# Patient Record
Sex: MALE | Race: WHITE | Employment: FULL TIME | URBAN - NONMETROPOLITAN AREA
[De-identification: names, ages, dates, MRNs, and addresses within clinical notes are randomized per-mention and may not be internally consistent; named-entity substitution may affect disease eponyms.]

---

## 2022-12-11 NOTE — PROGRESS NOTES
[facility] and [facility] initial consultation note    Mitra Dobbins                                                Chief complaint: Mitra Dobbins is a 40 y. o.oldmale came for further evaluation regarding his sleep apnea  with referral from       Patient underwent sleep study by Tyrus Ganser- telehealth in the past.    [place]:    Sleep/Wake schedule:  Usual time to go to bed during the work/regular day of week:   Usual time to wake up during the work//regular day of week :   Over the weekends his sleep schedule: [] Remain same. []phase delayed. He usually falls a sleep in less than:   He takes naps: {YES/NO:}. Number of naps per week:    During each nap he spends a total of: The naps were reported as refreshing: NO    Sleep Hygiene:    Is the temperature and evironment in his bed room is acceptable to him: Yes. He watches Television in his bed room: {YES/NO:}. He read books, study, pay bills etc in the bed: {YES/NO:}. Frequency He wake up during night/sleep:   Majority of nocturnal awakenings are for urination: {YES/NO:}. Difficulty in falling back to sleep after nocturnal awakenings: No    Do you drink coffee: {YES/NO:}. Do you drink caffeinated beverages i.e sodas: {YES/NO:}. Do you drink tea: {YES/NO:}. Do you drink alcoholic beverages: {[id number]}. History of recreational drug use: {YES/NO:}. Social History     Tobacco Use    Smoking status: Former     Packs/day: 1.00     Years: 8.00     Pack years: 8.00     Types: Cigarettes     Quit date:      Years since quittin.0    Smokeless tobacco: Never   Substance Use Topics    Alcohol use: Yes     Comment: occasional    Drug use: Never       Sleep apnea symptoms:    Hewas diagnosed with sleep apnea in the past.  Please see diagnostic data section for details. History of headaches in the morning:{YES/NO:}.   History of dry mouth in the morning: {YES/NO:}. History of palpitations during night time/nocturnal awakenings: {YES/NO:}. History of sweating during night time/nocturnal awakenings: {YES/NO:}    General:  History of head injury in the past: {YES/NO:}. []  Due to MVA  [] Not due to MVA. History of seizures: NO.  Rest less legs syndrome symptoms:NO  History suggestive of periodic limb movements during sleep: NO  History suggestive of hypnagogic hallucinations: NO  History suggestive of hypnopompic hallucinations: NO  History suggestive of sleep talking: NO  History suggestive of sleep walking:NO  History suggestive of bruxism: NO   [] No mouth guard. [] Uses mouth guard. History suggestive of cataplexy: NO  History suggestive of sleep paralysis:NO    Family history of sleep disorders:  Family history of obstructive sleep apnea: {YES/NO:}. Family history of Narcolepsy: {YES/NO:}. Family history of Rest less legs syndrome : {YES/NO:}. History regarding old sleep studies:  Prior history of sleep study: {YES/NO:}. Please see the diagnostic data section for details. Using CPAP device: {YES/NO:}. Currently using home Oxygen: NO.      Patient considerations:  Is the patient is ambulatory: Yes  Patient is currently using: None of these Wheelchair, [employer] or [employer]. Para/Quadriplegic: NO  Hearing deficit : NO  Claustrophobic: NO  MDD : NO  Blind: NO  Incontinent: NO  Para/Quadraplegi: NO.   Need transportation to and from Sleep Center:NO    Social History:  Social History     Tobacco Use    Smoking status: Former     Packs/day: 1.00     Years: 8.00     Pack years: 8.00     Types: Cigarettes     Quit date:      Years since quittin.0    Smokeless tobacco: Never   Substance Use Topics    Alcohol use: Yes     Comment: occasional    Drug use: Never   . He is currently working: {YES/NO:}. [] Retired.    []Disabled     He usually goes to his work at:     He completes his work at: No past medical history on file. No past surgical history on file. No Known Allergies    Current Outpatient Medications   Medication Sig Dispense Refill    Pantoprazole Sodium (PROTONIX PO) Take by mouth       No current facility-administered medications for this visit. No family history on file. Review of Systems:    General/Constitutional: No recent loss of weight or appetite changes. No fever or chills. HENT: Negative. Eyes: Negative. Upper respiratory tract: No nasal stuffiness or post nasal drip. Lower respiratory tract/ lungs: No cough or sputum production. No hemoptysis. Cardiovascular: No palpitations or chest pain. Gastrointestinal: No nausea or vomiting. Neurological: No focal neurologiacal weakness. Extremities: No edema. Musculoskeletal: No complaints. Genitourinary: No complaints. Hematological: Negative. Psychiatric/Behavioral: Negative. Skin: No itching. /72 (Site: Left Upper Arm, Position: Sitting, Cuff Size: Large Adult)   Pulse 91   Temp 97 °F (36.1 °C)   Ht 6' 1\" (1.854 m)   Wt (!) 371 lb 6.4 oz (168.5 kg)   SpO2 98% Comment: room air at rest  BMI 49.00 kg/m²   BMI:  Body mass index is 49 kg/m². Physical Exam:  Nursing note and vitals reviewed. Constitutional: Patient appears moderately built and moderately nourished. No distress. Patient is oriented to person, place, and time. HENT:   Head: Normocephalic and atraumatic. Right Ear: External ear normal.   Left Ear: External ear normal.   Mouth/Throat: Oropharynx is clear and moist.  No oral thrush. Eyes: Conjunctivae are normal. Pupils are equal, round, and reactive to light. No scleral icterus. Neck: Neck supple. No JVD present. No tracheal deviation present. Cardiovascular: Normal rate, regular rhythm, normal heart sounds. No murmur heard. Pulmonary/Chest: Effort normal and breath sounds normal. No stridor. No respiratory distress. No wheezes. No rales.  Patient exhibits no tenderness. Abdominal: Soft. Patient exhibits no distension. No tenderness. Musculoskeletal: Normal range of motion. Extremities: Patient exhibits no edema and no tenderness. Lymphadenopathy:  No cervical adenopathy. Neurological: Patient is alert and oriented to person, place, and time. Skin: Skin is warm and dry. Patient is not diaphoretic. Psychiatric: Patient  has a normal mood and affect. Patient behavior is normal.     Diagnostic Data:      Sleep test:      CPAP test:      Assessment:  -Mild/Moderate/Severe obstructive sleep apnea on treatment with a CPAP *** cm H20. He is using his PAP device with excellent compliance for >4hours and benefiting from it. He denies any clinical symptoms.  -Inadequate sleep hygiene. No past medical history on file. Recommendations/Plan:  -He was advised to continue current positive airway pressure therapy with above described pressure.  -He advised to keep good compliance with current recommended pressure to get optimal results and clinical improvement.  -Follow with my clinic in 6months for clinical reevaluation with review of download. -He was advised to call [employer] regarding supplies if needed. -He was advised to practice good sleep hygiene techniques. He was provided with a hand out.  -He was advised call my office for earlier appointment if needed for worsening of sleep symptoms.  -Shashank Hensley educated about my impression and plan. Patient verbalizes understanding.      -I personally reviewed updated the Past medical hx, Past surgical hx,Social hx, Family hx, Medications, Allergies in the discrete data section of the patient chart along with labs, Pulmonary medicine,Sleep medicine related, Pathological, Microbiological and Radiological investigations.

## 2023-01-06 ENCOUNTER — INITIAL CONSULT (OUTPATIENT)
Dept: PULMONOLOGY | Age: 38
End: 2023-01-06
Payer: COMMERCIAL

## 2023-01-06 VITALS
DIASTOLIC BLOOD PRESSURE: 72 MMHG | WEIGHT: 315 LBS | BODY MASS INDEX: 41.75 KG/M2 | HEART RATE: 91 BPM | SYSTOLIC BLOOD PRESSURE: 122 MMHG | HEIGHT: 73 IN | TEMPERATURE: 97 F | OXYGEN SATURATION: 98 %

## 2023-01-06 DIAGNOSIS — G47.10 HYPERSOMNIA: ICD-10-CM

## 2023-01-06 DIAGNOSIS — G47.33 OSA ON CPAP: Primary | ICD-10-CM

## 2023-01-06 DIAGNOSIS — Z99.89 OSA ON CPAP: Primary | ICD-10-CM

## 2023-01-06 PROCEDURE — 99204 OFFICE O/P NEW MOD 45 MIN: CPT | Performed by: INTERNAL MEDICINE

## 2023-01-06 NOTE — PROGRESS NOTES
[place] [facility] and [address] initial consultation note    Kecia Lewis                                                Chief complaint: Kecia Lewis is a 40 y. o.oldmale PMHx of GERD came for further evaluation regarding his sleep apnea with referral from self. He saw Dr. Jay Newell in [facility] regularly. Patient went to [place] in November with his friend. While sharing a room his friend recorded him sleeping. His breathing sounded \"like Darth Hettick\". He was concerned about possible sleep apnea. Stated he has woken for the last few years not feeling refreshed. Furthermore his mother stated when he was young after breaking his nose during football he has breathed similarly while sleeping. Wakes in the mornings most of the time with dry mouth and sore throat. Received sleep study about a month ago. Patient underwent sleep study by [facility] in the past by using a Watchpat portable device. [place]:    Sleep/Wake schedule:  Usual time to go to bed during the work/regular day of week: 10:30  Usual time to wake up during the work//regular day of week : 05:30  Over the weekends his sleep schedule:  [x]phase delayed. Sleep schedule radically different. Can fall asleep as late as 4am and wake as late as 9am.   He usually falls a sleep in less than: 45min - 1hour  He takes naps: Yes. Involuntarily. Doze off once or twice per week watching TV. Number of naps per week:  2  During each nap he spends a total of:  30mins-1 hour  The naps were reported as refreshing: Yes    Sleep Hygiene:    Is the temperature and evironment in his bed room is acceptable to him: Yes. He watches Television in his bed room: No.  He read books, study, pay bills etc in the bed: No.  Frequency He wake up during night/sleep: 0 to 1x per night  Majority of nocturnal awakenings are for urination: Yes.     Difficulty in falling back to sleep after nocturnal awakenings: No    Do you drink coffee: No.  - infrequently on the weekends  Do you drink caffeinated beverages i.e sodas: No.    Do you drink tea: No.      Do you drink alcoholic beverages: Yes. 1-2 glasses on weekend  History of recreational drug use: No.     Social History     Tobacco Use    Smoking status: Former     Packs/day: 1.00     Years: 8.00     Pack years: 8.00     Types: Cigarettes     Quit date:      Years since quittin.0    Smokeless tobacco: Never   Substance Use Topics    Alcohol use: Yes     Comment: occasional    Drug use: Never       Sleep apnea symptoms:    Hewas diagnosed with sleep apnea in the past.  Please see diagnostic data section for details. History of headaches in the morning:No.  History of dry mouth in the morning: Yes. History of palpitations during night time/nocturnal awakenings: No.  History of sweating during night time/nocturnal awakenings: No    General:  History of head injury in the past: No.    History of seizures: NO.  Rest less legs syndrome symptoms:NO  History suggestive of periodic limb movements during sleep: NO  History suggestive of hypnagogic hallucinations: NO  History suggestive of hypnopompic hallucinations: NO  History suggestive of sleep talking: NO  History suggestive of sleep walking:NO  History suggestive of bruxism: NO    History suggestive of cataplexy: NO  History suggestive of sleep paralysis:NO    Family history of sleep disorders:  Family history of obstructive sleep apnea: Yes. Mom and uncle  Family history of Narcolepsy: No.   Family history of Rest less legs syndrome : No.       History regarding old sleep studies:  Prior history of sleep study: Yes. Please see the diagnostic data section for details. Using CPAP device: No.  Currently using home Oxygen: NO.      Patient considerations:  Is the patient is ambulatory: Yes  Patient is currently using: None of these Wheelchair, [employer] or [employer].   Para/Quadriplegic: NO  Hearing deficit : NO  Claustrophobic: NO  MDD : NO  Blind: NO  Incontinent: NO  Para/Quadraplegi: NO.   Need transportation to and from Sleep Center:NO    Social History:  Social History     Tobacco Use    Smoking status: Former     Packs/day: 1.00     Years: 8.00     Pack years: 8.00     Types: Cigarettes     Quit date:      Years since quittin.0    Smokeless tobacco: Never   Substance Use Topics    Alcohol use: Yes     Comment: occasional    Drug use: Never   . He is currently working: Yes. He usually goes to his work at:     He completes his work at:                         No past medical history on file. No past surgical history on file. No Known Allergies    Current Outpatient Medications   Medication Sig Dispense Refill    Pantoprazole Sodium (PROTONIX PO) Take by mouth       No current facility-administered medications for this visit. No family history on file. Review of Systems:    General/Constitutional: No 30-40 lbs at beginning of COVID just starting to loose weight maybe a pound or two or appetite changes. No fever or chills. Recent kidney infection 18 Dec 10 days of cipro  HENT: Negative. Eyes: Negative. Upper respiratory tract: No nasal stuffiness or post nasal drip. Lower respiratory tract/ lungs: No cough or sputum production. No hemoptysis. Cardiovascular: No palpitations or chest pain. Gastrointestinal: No nausea or vomiting. Neurological: No focal neurologiacal weakness. Extremities: No edema. Musculoskeletal: No complaints. Genitourinary: No complaints. Recent kidney infection 18 Dec 10 days of cipro  Hematological: Negative. Psychiatric/Behavioral: Negative. Skin: No itching. /72 (Site: Left Upper Arm, Position: Sitting, Cuff Size: Large Adult)   Pulse 91   Temp 97 °F (36.1 °C)   Ht 6' 1\" (1.854 m)   Wt (!) 371 lb 6.4 oz (168.5 kg)   SpO2 98% Comment: room air at rest  BMI 49.00 kg/m²   BMI:  Body mass index is 49 kg/m². Mallampati airway Class:4  Neck Circumference:. 19Inches  [place] sleepiness score 1/6/23: 11.  Sleep apnea quality of life questionnaire: 64    Physical Exam:  Nursing note and vitals reviewed. Constitutional: Obese male sitting comfortably  No distress. Patient is oriented to person, place, and time. HENT:   Head: Normocephalic and atraumatic. Right Ear: External ear normal.  Hearing grossly intact  Left Ear: External ear normal. Hearing grossly intact  Mouth/Throat: Oropharynx is clear and moist.  No oral thrush. Eyes: Conjunctivae are normal.  EOMI, pupils are equal, round, and reactive to light. No scleral icterus. Neck: Neck supple. No JVD present. No tracheal deviation present. Cardiovascular: Normal rate, regular rhythm, normal heart sounds. No murmur heard. Pulmonary/Chest: Effort normal and breath sounds normal. No stridor. No respiratory distress. No wheezes. No rales. Patient exhibits no tenderness. Abdominal: Soft. Patient exhibits no distension. No tenderness. Musculoskeletal: Normal range of motion. Extremities: Patient exhibits no edema and no tenderness. Lymphadenopathy:  No cervical adenopathy. Neurological: Patient is alert and oriented to person, place, and time. Skin: Skin is warm and dry. Patient is not diaphoretic. Psychiatric: Patient  has a normal mood and affect. Patient behavior is normal.     Diagnostic Data:      Sleep test:  Patient underwent portable sleep study by using watch pat device on 9 November 2022 by using a telehealth physician services. However, the details of the methods were not mentioned in the dictation by the interpreting physician to confirm the device used for sleep apnea testing. CPAP test:  None in epic      Assessment:  -Moderately severe obstructive sleep apnea diagnosed by a portable/home sleep study performed on 9 November 2022 via [employer] [facility]. The study was performed at using the watch pat device (this need to be confirmed).   The details of the methods used for testing were not available to confirm in the dictation.  -Inadequate sleep hygiene.  -Hypersomnia ( Excessive daytime sleepiness) may be due to obstructive sleep apnea Vs Inadequate sleep hygiene.  -He denied any prior history of COPD, CHF, Obesity hypoventilation, Prior palate surgery and Central sleep apnea. Recommendations/Plan:  -He was advised to contact his telehealth provider to obtain detailed methods report used to perform his sleep apnea testing/home sleep study on 9 November 2022 by using watch pat device. He was also informed about the possible need of repeat sleep study if the details of methods did not meet the AASM criteria for portable sleep apnea testing.    -I had a discussion with patient regarding avialable treatment options for his sleep disorder breathing including but not limited to CPAP titration in the sleep lab Vs.Dental appliance placement with referral to a local dentist Vs other available surgical options including Uvulopalatopharyngoplasty, maxillomandibular ostomy and tracheostomy as last option. At the end of discussion, he decided to go for treatment with a CPAP device therapy.  -Will start patient on Auto CPAP machine therapy with a Minimum CPAP of 6cm H20 and Maximum CPAP of 20cm H20. He need to be followed closely with down load by DME company in 4 weeks. He was instructed to make earlier appointment with my sleep clinic if he had any difficulty in using his auto CPAP machine therapy to consider for in lab CPAP titration. He verbalizes understanding.  -He was advised to make early appointment with my clinic if he develops any of the following conditions including -> COPD, CHF,Obesity hypoventilation syndrome, undergo palate surgery and Central sleep apnea his Auto CPAP/BiPAP settings to a fixed CPAP/BiPAP pressure settings.  He verbalizes understanding.  -He need to come for follow up with my clinic in 2months to 10 weeks with a CPAP down load for clinical reevaluation and management.  -he advised to keep good compliance with recommended positive airway pressure therapy to get optimal results and clinical improvement.  -He was advised to call DME company regarding supplies if needed. -He was advised to call my office for earlier appointment if needed for worsening of sleep symptoms.  -He was advised to continue to practice good sleep hygiene practices.  -He was instructed to not to drive any motor vehicles or operate heavy equipment if he feels sleepy. -He was advised to loose weight by controlling diet and doing exercise once cleared by his family physician. -Brionna Castro was educated about my impression and plan. He verbalizes understanding.      -I personally reviewed updated the Past medical hx, Past surgical hx,Social hx, Family hx, Medications, Allergies in the discrete data section of the patient chart along with labs, Pulmonary medicine,Sleep medicine related, Pathological, Microbiological and Radiological investigations. Addendum by Dr. Alondra Cm MD:  Patient seen by me independently including key components of medical care. Face to face evaluation and examination was performed. Case discussed with Jasmina Smyth DO -resident physician. Italicized font, if present,  represents changes to the note made by me. More than 50% of the encounter time involved with patient care by the Pulmonary & Critical care service team spent by me. Please see my modifications mentioned below:  See detailed plan as above  Brionna Castro educated about my impression and plan. He verbalizes understanding.     Electronically signed by   Emili Gregg MD on 1/6/2023 at 10:52 AM

## 2023-01-06 NOTE — PROGRESS NOTES
Chief Complaint: New sleep consult    Mallampati airway Class:4  Neck Circumference:. 19Inches    [place] sleepiness score 1/6/23: .   Sleep apnea quality of life questionnaire:

## 2024-01-07 ENCOUNTER — HOSPITAL ENCOUNTER (EMERGENCY)
Age: 39
Discharge: HOME | End: 2024-01-07
Attending: EMERGENCY MEDICINE
Payer: COMMERCIAL

## 2024-01-07 ENCOUNTER — APPOINTMENT (OUTPATIENT)
Dept: GENERAL RADIOLOGY | Age: 39
End: 2024-01-07
Payer: COMMERCIAL

## 2024-01-07 VITALS
DIASTOLIC BLOOD PRESSURE: 94 MMHG | OXYGEN SATURATION: 98 % | RESPIRATION RATE: 13 BRPM | TEMPERATURE: 97.6 F | WEIGHT: 310 LBS | HEART RATE: 72 BPM | SYSTOLIC BLOOD PRESSURE: 148 MMHG | HEIGHT: 73 IN | BODY MASS INDEX: 41.08 KG/M2

## 2024-01-07 DIAGNOSIS — G56.22 ULNAR NERVE COMPRESSION, LEFT: ICD-10-CM

## 2024-01-07 DIAGNOSIS — R07.9 CHEST PAIN, UNSPECIFIED TYPE: Primary | ICD-10-CM

## 2024-01-07 LAB
ANION GAP SERPL CALC-SCNC: 11 MEQ/L (ref 8–16)
BASOPHILS ABSOLUTE: 0 THOU/MM3 (ref 0–0.1)
BASOPHILS NFR BLD AUTO: 0.4 %
BUN SERPL-MCNC: 9 MG/DL (ref 7–22)
CALCIUM SERPL-MCNC: 9.7 MG/DL (ref 8.5–10.5)
CHLORIDE SERPL-SCNC: 103 MEQ/L (ref 98–111)
CO2 SERPL-SCNC: 26 MEQ/L (ref 23–33)
CREAT SERPL-MCNC: 0.7 MG/DL (ref 0.4–1.2)
DEPRECATED RDW RBC AUTO: 41.8 FL (ref 35–45)
EKG ATRIAL RATE: 81 BPM
EKG P AXIS: 49 DEGREES
EKG P-R INTERVAL: 184 MS
EKG Q-T INTERVAL: 378 MS
EKG QRS DURATION: 90 MS
EKG QTC CALCULATION (BAZETT): 439 MS
EKG R AXIS: 25 DEGREES
EKG T AXIS: 57 DEGREES
EKG VENTRICULAR RATE: 81 BPM
EOSINOPHIL NFR BLD AUTO: 2.5 %
EOSINOPHILS ABSOLUTE: 0.3 THOU/MM3 (ref 0–0.4)
ERYTHROCYTE [DISTWIDTH] IN BLOOD BY AUTOMATED COUNT: 13 % (ref 11.5–14.5)
GFR SERPL CREATININE-BSD FRML MDRD: > 60 ML/MIN/1.73M2
GLUCOSE SERPL-MCNC: 100 MG/DL (ref 70–108)
HCT VFR BLD AUTO: 44.6 % (ref 42–52)
HGB BLD-MCNC: 14.9 GM/DL (ref 14–18)
IMM GRANULOCYTES # BLD AUTO: 0.03 THOU/MM3 (ref 0–0.07)
IMM GRANULOCYTES NFR BLD AUTO: 0.3 %
LYMPHOCYTES ABSOLUTE: 2.9 THOU/MM3 (ref 1–4.8)
LYMPHOCYTES NFR BLD AUTO: 24.8 %
MCH RBC QN AUTO: 29.3 PG (ref 26–33)
MCHC RBC AUTO-ENTMCNC: 33.4 GM/DL (ref 32.2–35.5)
MCV RBC AUTO: 87.8 FL (ref 80–94)
MONOCYTES ABSOLUTE: 0.8 THOU/MM3 (ref 0.4–1.3)
MONOCYTES NFR BLD AUTO: 6.7 %
NEUTROPHILS NFR BLD AUTO: 65.3 %
NRBC BLD AUTO-RTO: 0 /100 WBC
OSMOLALITY SERPL CALC.SUM OF ELEC: 278.2 MOSMOL/KG (ref 275–300)
PLATELET # BLD AUTO: 310 THOU/MM3 (ref 130–400)
PMV BLD AUTO: 9.6 FL (ref 9.4–12.4)
POTASSIUM SERPL-SCNC: 4 MEQ/L (ref 3.5–5.2)
RBC # BLD AUTO: 5.08 MILL/MM3 (ref 4.7–6.1)
SEGMENTED NEUTROPHILS ABSOLUTE COUNT: 7.6 THOU/MM3 (ref 1.8–7.7)
SODIUM SERPL-SCNC: 140 MEQ/L (ref 135–145)
TROPONIN, HIGH SENSITIVITY: < 6 NG/L (ref 0–12)
WBC # BLD AUTO: 11.7 THOU/MM3 (ref 4.8–10.8)

## 2024-01-07 PROCEDURE — 80048 BASIC METABOLIC PNL TOTAL CA: CPT

## 2024-01-07 PROCEDURE — 93010 ELECTROCARDIOGRAM REPORT: CPT | Performed by: INTERNAL MEDICINE

## 2024-01-07 PROCEDURE — 84484 ASSAY OF TROPONIN QUANT: CPT

## 2024-01-07 PROCEDURE — 85025 COMPLETE CBC W/AUTO DIFF WBC: CPT

## 2024-01-07 PROCEDURE — 93005 ELECTROCARDIOGRAM TRACING: CPT | Performed by: EMERGENCY MEDICINE

## 2024-01-07 PROCEDURE — 71045 X-RAY EXAM CHEST 1 VIEW: CPT

## 2024-01-07 PROCEDURE — 36415 COLL VENOUS BLD VENIPUNCTURE: CPT

## 2024-01-07 PROCEDURE — 99285 EMERGENCY DEPT VISIT HI MDM: CPT

## 2024-01-07 NOTE — ED PROVIDER NOTES
Notable for the following components:       Result Value    WBC 11.7 (*)     All other components within normal limits   BASIC METABOLIC PANEL   TROPONIN   ANION GAP   GLOMERULAR FILTRATION RATE, ESTIMATED   OSMOLALITY       EMERGENCY DEPARTMENT COURSE:   Vitals:    Vitals:    01/07/24 1559 01/07/24 1639 01/07/24 1705 01/07/24 1739   BP: (!) 164/104 (!) 148/94     Pulse: 77 73 72 72   Resp: 21 13 18 13   Temp:       TempSrc:       SpO2:  97% 97% 98%   Weight:       Height:             CRITICAL CARE:       CONSULTS:  None    PROCEDURES:  none    FINAL IMPRESSION      1. Chest pain, unspecified type    2. Ulnar nerve compression, left          DISPOSITION/PLAN   Decision To Discharge    PATIENT REFERRED TO:  [facility] PHYSICIAN [facility]  [address]  [address]  [facility] [address]  Go in 1 day  RE-CHECK AND FURTHER TESTING AS NEEDED FOR tingling in your fingers    [facility]  [address] [place], [place] [id number] [phone]  Schedule an appointment as soon as possible for a visit   Follow-up for chest pain      DISCHARGE MEDICATIONS:  Discharge Medication List as of 1/7/2024  5:42 PM          (Please note that portions of this note were completed with a voice recognition program.  Efforts were made to edit the dictations but occasionally words are mis-transcribed.)    DO Shamir Roach Seth, DO  01/09/24 0729

## 2024-06-03 ENCOUNTER — TELEPHONE (OUTPATIENT)
Dept: FAMILY MEDICINE CLINIC | Age: 39
End: 2024-06-03

## 2024-06-03 NOTE — TELEPHONE ENCOUNTER
----- Message from Ladarius Aponte sent at 6/3/2024 10:02 AM EDT -----  Regarding: [facility] Appointment Request  [facility] Appointment Request    Patient needs appointment for [facility] Appointment Type: New Patient.    Reason for Appointment Request: No appointments available during search/ Pt wanted to establish care with Halima Tsang DO. Pt preferred date and time whenever is fine for him.  --------------------------------------------------------------------------------------------------------------------------    Relationship to Patient: Self     Call Back Information: OK to leave message on voicemail  Preferred Call Back Number: Phone [phone]

## 2024-06-06 ENCOUNTER — PATIENT MESSAGE (OUTPATIENT)
Dept: FAMILY MEDICINE CLINIC | Age: 39
End: 2024-06-06

## 2024-06-06 DIAGNOSIS — R19.7 DIARRHEA OF PRESUMED INFECTIOUS ORIGIN: Primary | ICD-10-CM

## 2024-06-06 NOTE — TELEPHONE ENCOUNTER
From: Heriberto Woods  To: Dr. Halima Tsang  Sent: 6/6/2024 2:07 PM EDT  Subject: New patient question     Halima,   Apologies on pestering you before our first appointment. But I’m unable to reach my gi the last two days. I had cdiff last November and have been having rough symptoms the past week. Is there anyway you can have a toxins test setup for me [facility] [place] ? So sorry to bother I’m just running out of options currently

## 2024-06-07 ENCOUNTER — NURSE ONLY (OUTPATIENT)
Dept: LAB | Age: 39
End: 2024-06-07

## 2024-06-07 DIAGNOSIS — R19.7 DIARRHEA OF PRESUMED INFECTIOUS ORIGIN: ICD-10-CM

## 2024-06-07 LAB
REASON FOR REJECTION: NORMAL
REJECTED TEST: NORMAL

## 2024-06-09 LAB — BACTERIA STL CULT: NORMAL

## 2024-06-10 ENCOUNTER — TELEPHONE (OUTPATIENT)
Dept: FAMILY MEDICINE CLINIC | Age: 39
End: 2024-06-10

## 2024-06-10 NOTE — TELEPHONE ENCOUNTER
I copy and pasted JESUS Luna - CNP response regarding the patient's lab results and sent them to the patient via My Chart.

## 2024-07-08 SDOH — ECONOMIC STABILITY: HOUSING INSECURITY
IN THE LAST 12 MONTHS, WAS THERE A TIME WHEN YOU DID NOT HAVE A STEADY PLACE TO SLEEP OR SLEPT IN A SHELTER (INCLUDING NOW)?: NO

## 2024-07-08 SDOH — ECONOMIC STABILITY: FOOD INSECURITY: WITHIN THE PAST 12 MONTHS, YOU WORRIED THAT YOUR FOOD WOULD RUN OUT BEFORE YOU GOT MONEY TO BUY MORE.: NEVER TRUE

## 2024-07-08 SDOH — ECONOMIC STABILITY: FOOD INSECURITY: WITHIN THE PAST 12 MONTHS, THE FOOD YOU BOUGHT JUST DIDN'T LAST AND YOU DIDN'T HAVE MONEY TO GET MORE.: NEVER TRUE

## 2024-07-08 SDOH — ECONOMIC STABILITY: INCOME INSECURITY: HOW HARD IS IT FOR YOU TO PAY FOR THE VERY BASICS LIKE FOOD, HOUSING, MEDICAL CARE, AND HEATING?: NOT HARD AT ALL

## 2024-07-08 SDOH — ECONOMIC STABILITY: TRANSPORTATION INSECURITY
IN THE PAST 12 MONTHS, HAS LACK OF TRANSPORTATION KEPT YOU FROM MEETINGS, WORK, OR FROM GETTING THINGS NEEDED FOR DAILY LIVING?: NO

## 2024-07-08 ASSESSMENT — PATIENT HEALTH QUESTIONNAIRE - PHQ9
1. LITTLE INTEREST OR PLEASURE IN DOING THINGS: NOT AT ALL
SUM OF ALL RESPONSES TO PHQ QUESTIONS 1-9: 0
SUM OF ALL RESPONSES TO PHQ9 QUESTIONS 1 & 2: 0
1. LITTLE INTEREST OR PLEASURE IN DOING THINGS: NOT AT ALL
SUM OF ALL RESPONSES TO PHQ9 QUESTIONS 1 & 2: 0
2. FEELING DOWN, DEPRESSED OR HOPELESS: NOT AT ALL
SUM OF ALL RESPONSES TO PHQ QUESTIONS 1-9: 0
2. FEELING DOWN, DEPRESSED OR HOPELESS: NOT AT ALL

## 2024-07-11 ENCOUNTER — OFFICE VISIT (OUTPATIENT)
Dept: FAMILY MEDICINE CLINIC | Age: 39
End: 2024-07-11
Payer: COMMERCIAL

## 2024-07-11 ENCOUNTER — PATIENT MESSAGE (OUTPATIENT)
Dept: FAMILY MEDICINE CLINIC | Age: 39
End: 2024-07-11

## 2024-07-11 VITALS
SYSTOLIC BLOOD PRESSURE: 126 MMHG | WEIGHT: 305.2 LBS | TEMPERATURE: 96.9 F | OXYGEN SATURATION: 97 % | HEART RATE: 81 BPM | HEIGHT: 73 IN | DIASTOLIC BLOOD PRESSURE: 86 MMHG | BODY MASS INDEX: 40.45 KG/M2 | RESPIRATION RATE: 16 BRPM

## 2024-07-11 DIAGNOSIS — R19.4 BOWEL HABIT CHANGES: Primary | ICD-10-CM

## 2024-07-11 DIAGNOSIS — K21.9 GASTROESOPHAGEAL REFLUX DISEASE WITHOUT ESOPHAGITIS: ICD-10-CM

## 2024-07-11 DIAGNOSIS — Z13.9 SCREENING DUE: ICD-10-CM

## 2024-07-11 PROCEDURE — 99203 OFFICE O/P NEW LOW 30 MIN: CPT | Performed by: FAMILY MEDICINE

## 2024-07-11 RX ORDER — ESOMEPRAZOLE MAGNESIUM 40 MG/1
40 GRANULE, DELAYED RELEASE ORAL DAILY
COMMUNITY
Start: 2024-06-25 | End: 2024-07-11

## 2024-07-11 RX ORDER — ESOMEPRAZOLE MAGNESIUM 40 MG/1
40 CAPSULE, DELAYED RELEASE ORAL
COMMUNITY

## 2024-07-11 RX ORDER — CHLORHEXIDINE GLUCONATE 4 %
LIQUID (ML) TOPICAL
COMMUNITY
Start: 2024-05-09 | End: 2024-07-11

## 2024-07-11 RX ORDER — METRONIDAZOLE 500 MG/1
500 TABLET ORAL 3 TIMES DAILY
Qty: 21 TABLET | Refills: 0 | Status: SHIPPED | OUTPATIENT
Start: 2024-07-11 | End: 2024-07-18

## 2024-07-11 RX ORDER — FAMOTIDINE 40 MG/1
40 TABLET, FILM COATED ORAL
COMMUNITY
Start: 2024-06-25

## 2024-07-11 ASSESSMENT — ENCOUNTER SYMPTOMS
WHEEZING: 0
ABDOMINAL PAIN: 1
DIARRHEA: 1
CONSTIPATION: 1
VOMITING: 0
SHORTNESS OF BREATH: 0
COUGH: 0
NAUSEA: 0

## 2024-07-11 NOTE — PROGRESS NOTES
Heriberto Woods (:  1985) is a 38 y.o. male,New patient, here for evaluation of the following chief complaint(s):  New Patient and GI Problem      Subjective   SUBJECTIVE/OBJECTIVE:  HPI  Tolerating medications well now that they switched to pill form of Nexium  Hasn't had problems with GERD since losing weight  Taking medications every day yes, compliant  Had labs done last , believes they were all normal  Additional concerns misdiagnosed C diff for 18 months, was given Cipro for 14 days for kidney infection, [facility] for treatment, retested in 2024 that was negative   2022 with initial symptoms then diagnosed 2023 with C diff, continued issues with bowels  Just did EGD and colonoscopy---unremarkable  Has follow up with GI on the   Weight loss over 100 lbs attempted, started prior to C diff    Review of Systems   Constitutional:  Positive for fatigue and unexpected weight change (attempted). Negative for activity change, chills and fever.   Respiratory:  Negative for cough, shortness of breath and wheezing.    Cardiovascular:  Negative for chest pain, palpitations and leg swelling.   Gastrointestinal:  Positive for abdominal pain (left lower quadrant, feels like when he had C diff), constipation (with fiber use, hard small stools) and diarrhea (porridge type consistency, 1-2 times per day normally). Negative for nausea and vomiting.          Objective   Physical Exam  Vitals reviewed.   Constitutional:       General: He is not in acute distress.     Appearance: Normal appearance. He is obese. He is not ill-appearing.   Cardiovascular:      Rate and Rhythm: Normal rate and regular rhythm.      Heart sounds: No murmur heard.     No gallop.   Pulmonary:      Effort: Pulmonary effort is normal.      Breath sounds: Normal breath sounds. No wheezing, rhonchi or rales.   Abdominal:      General: Abdomen is flat. Bowel sounds are increased. There is no distension.      Palpations: Abdomen is soft.

## 2024-07-11 NOTE — TELEPHONE ENCOUNTER
From: Dr. Halima Tsang  To: Heriberto Woods  Sent: 7/11/2024 8:40 AM EDT  Subject: labs    Heriberto-  Neither [facility], [facility], [facility] on [address] had any records of screening, HbA1C labs from last fall.

## 2024-07-11 NOTE — TELEPHONE ENCOUNTER
It doesn't cause relapse, it is just sometimes too weak to treat a severe infection.  I wanted to try something weaker to begin with...  I'll send the lab order to [facility]

## 2024-07-12 ENCOUNTER — LAB (OUTPATIENT)
Dept: LAB | Age: 39
End: 2024-07-12

## 2024-07-12 DIAGNOSIS — K21.9 GASTROESOPHAGEAL REFLUX DISEASE WITHOUT ESOPHAGITIS: ICD-10-CM

## 2024-07-12 DIAGNOSIS — Z13.9 SCREENING DUE: ICD-10-CM

## 2024-07-12 LAB
ALBUMIN SERPL BCG-MCNC: 4.6 G/DL (ref 3.5–5.1)
ALP SERPL-CCNC: 96 U/L (ref 38–126)
ALT SERPL W/O P-5'-P-CCNC: 13 U/L (ref 11–66)
ANION GAP SERPL CALC-SCNC: 12 MEQ/L (ref 8–16)
AST SERPL-CCNC: 14 U/L (ref 5–40)
BASOPHILS ABSOLUTE: 0.1 THOU/MM3 (ref 0–0.1)
BASOPHILS NFR BLD AUTO: 0.5 %
BILIRUB SERPL-MCNC: 1.4 MG/DL (ref 0.3–1.2)
BUN SERPL-MCNC: 9 MG/DL (ref 7–22)
CALCIUM SERPL-MCNC: 9.8 MG/DL (ref 8.5–10.5)
CHLORIDE SERPL-SCNC: 98 MEQ/L (ref 98–111)
CHOLEST SERPL-MCNC: 180 MG/DL (ref 100–199)
CO2 SERPL-SCNC: 27 MEQ/L (ref 23–33)
CREAT SERPL-MCNC: 0.8 MG/DL (ref 0.4–1.2)
DEPRECATED MEAN GLUCOSE BLD GHB EST-ACNC: 93 MG/DL (ref 70–126)
DEPRECATED RDW RBC AUTO: 38.9 FL (ref 35–45)
EOSINOPHIL NFR BLD AUTO: 3.1 %
EOSINOPHILS ABSOLUTE: 0.3 THOU/MM3 (ref 0–0.4)
ERYTHROCYTE [DISTWIDTH] IN BLOOD BY AUTOMATED COUNT: 12.4 % (ref 11.5–14.5)
GFR SERPL CREATININE-BSD FRML MDRD: > 90 ML/MIN/1.73M2
GLUCOSE SERPL-MCNC: 89 MG/DL (ref 70–108)
HBA1C MFR BLD HPLC: 5.1 % (ref 4.4–6.4)
HCT VFR BLD AUTO: 43.3 % (ref 42–52)
HDLC SERPL-MCNC: 45 MG/DL
HGB BLD-MCNC: 14.8 GM/DL (ref 14–18)
IMM GRANULOCYTES # BLD AUTO: 0.04 THOU/MM3 (ref 0–0.07)
IMM GRANULOCYTES NFR BLD AUTO: 0.4 %
LDLC SERPL CALC-MCNC: 110 MG/DL
LYMPHOCYTES ABSOLUTE: 2.9 THOU/MM3 (ref 1–4.8)
LYMPHOCYTES NFR BLD AUTO: 28.9 %
MCH RBC QN AUTO: 29.8 PG (ref 26–33)
MCHC RBC AUTO-ENTMCNC: 34.2 GM/DL (ref 32.2–35.5)
MCV RBC AUTO: 87.1 FL (ref 80–94)
MONOCYTES ABSOLUTE: 0.6 THOU/MM3 (ref 0.4–1.3)
MONOCYTES NFR BLD AUTO: 6.1 %
NEUTROPHILS ABSOLUTE: 6.2 THOU/MM3 (ref 1.8–7.7)
NEUTROPHILS NFR BLD AUTO: 61 %
NRBC BLD AUTO-RTO: 0 /100 WBC
PLATELET # BLD AUTO: 325 THOU/MM3 (ref 130–400)
PMV BLD AUTO: 10.9 FL (ref 9.4–12.4)
POTASSIUM SERPL-SCNC: 4.1 MEQ/L (ref 3.5–5.2)
PROT SERPL-MCNC: 7.7 G/DL (ref 6.1–8)
RBC # BLD AUTO: 4.97 MILL/MM3 (ref 4.7–6.1)
SODIUM SERPL-SCNC: 137 MEQ/L (ref 135–145)
TRIGL SERPL-MCNC: 127 MG/DL (ref 0–199)
WBC # BLD AUTO: 10.1 THOU/MM3 (ref 4.8–10.8)

## 2024-07-31 ENCOUNTER — TELEMEDICINE (OUTPATIENT)
Dept: FAMILY MEDICINE CLINIC | Age: 39
End: 2024-07-31
Payer: COMMERCIAL

## 2024-07-31 DIAGNOSIS — F41.9 ANXIETY: ICD-10-CM

## 2024-07-31 DIAGNOSIS — K21.9 GASTROESOPHAGEAL REFLUX DISEASE WITHOUT ESOPHAGITIS: Primary | ICD-10-CM

## 2024-07-31 DIAGNOSIS — K58.9 IRRITABLE BOWEL SYNDROME, UNSPECIFIED TYPE: ICD-10-CM

## 2024-07-31 PROCEDURE — 99422 OL DIG E/M SVC 11-20 MIN: CPT | Performed by: FAMILY MEDICINE

## 2024-07-31 RX ORDER — SERTRALINE HYDROCHLORIDE 25 MG/1
25 TABLET, FILM COATED ORAL DAILY
Qty: 30 TABLET | Refills: 0 | Status: SHIPPED | OUTPATIENT
Start: 2024-07-31

## 2024-07-31 ASSESSMENT — ENCOUNTER SYMPTOMS
DIARRHEA: 1
NAUSEA: 1
ABDOMINAL PAIN: 1
WHEEZING: 0
VOMITING: 0
COUGH: 0
SHORTNESS OF BREATH: 0
CONSTIPATION: 1

## 2024-07-31 NOTE — PROGRESS NOTES
Heriberto Woods, was evaluated through a synchronous (real-time) audio-video encounter. The patient (or guardian if applicable) is aware that this is a billable service, which includes applicable co-pays. This Virtual Visit was conducted with patient's (and/or legal guardian's) consent. Patient identification was verified, and a caregiver was present when appropriate.   The patient was located at Home:  [address] [place]  [facility] [address]  Provider was located at Facility (Appt Dept): [address] [place] [phone]  Confirm you are appropriately licensed, registered, or certified to deliver care in the state where the patient is located as indicated above. If you are not or unsure, please re-schedule the visit: Yes, I confirm.     Heriberto Woods (:  1985) is a Established patient, presenting virtually for evaluation of the following:    Assessment & Plan   Below is the assessment and plan developed based on review of pertinent history, physical exam, labs, studies, and medications.  1. Gastroesophageal reflux disease without esophagitis  2. Irritable bowel syndrome, unspecified type  3. Anxiety  -     sertraline (ZOLOFT) 25 MG tablet; Take 1 tablet by mouth daily, Disp-30 tablet, R-0Normal  Also discussed taking some time off work to help with the anxiety and allow for the medication to work    Return if symptoms worsen or fail to improve.       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

## 2024-08-02 ENCOUNTER — PATIENT MESSAGE (OUTPATIENT)
Dept: FAMILY MEDICINE CLINIC | Age: 39
End: 2024-08-02

## 2024-08-02 DIAGNOSIS — R19.7 DIARRHEA, UNSPECIFIED TYPE: Primary | ICD-10-CM

## 2024-08-05 NOTE — TELEPHONE ENCOUNTER
From: Heriberto Woods  To: Dr. Halima Tsang  Sent: 8/2/2024 3:19 PM EDT  Subject: Stomach issues    Dr. Varghese,     When w least spoke I mentioned having loose stools from Chinese food. It is continuing today - day 5- and it makes me sprint to the bathroom 30 minutes after eating anything. Should I retest for cdiff? I havnt felt this urgency in a long time for this duration. Sorry to pester just worrying it has continued for 5 days

## 2024-08-06 NOTE — TELEPHONE ENCOUNTER
I put in the order for [facility] with the following comment:  Please test if possible regardless of consistency of stool, as patient had positive C diff in the past with slightly formed stools

## 2024-08-07 ENCOUNTER — LAB (OUTPATIENT)
Dept: LAB | Age: 39
End: 2024-08-07

## 2024-08-07 DIAGNOSIS — R19.7 DIARRHEA, UNSPECIFIED TYPE: ICD-10-CM

## 2024-08-07 LAB — C DIFF GDH STL QL: NEGATIVE

## 2024-08-09 LAB — BACTERIA STL CULT: NORMAL

## 2024-08-12 ENCOUNTER — PATIENT MESSAGE (OUTPATIENT)
Dept: FAMILY MEDICINE CLINIC | Age: 39
End: 2024-08-12

## 2024-08-13 NOTE — TELEPHONE ENCOUNTER
The patient called and said that he is going to have his mom, Annika Overs  this form, he will sign it and turn it in.     I gave the forms to Stefany upfront and let her know.

## 2024-08-20 ENCOUNTER — PATIENT MESSAGE (OUTPATIENT)
Dept: FAMILY MEDICINE CLINIC | Age: 39
End: 2024-08-20

## 2024-08-20 DIAGNOSIS — F41.9 ANXIETY: Primary | ICD-10-CM

## 2024-11-20 ENCOUNTER — PATIENT MESSAGE (OUTPATIENT)
Dept: FAMILY MEDICINE CLINIC | Age: 39
End: 2024-11-20

## 2024-11-20 DIAGNOSIS — F41.9 ANXIETY: Primary | ICD-10-CM

## 2024-11-20 NOTE — TELEPHONE ENCOUNTER
Heriberto Woods needs refill of   Requested Prescriptions      No prescriptions requested or ordered in this encounter       Last Filled on:  8/26/2024 #30 R-0 sent to [facility] in [place] by Halima Tsang DO.    Last Visit Date:  7/31/2024    Next Visit Date:  Visit date not found

## 2025-01-13 SDOH — ECONOMIC STABILITY: FOOD INSECURITY: WITHIN THE PAST 12 MONTHS, THE FOOD YOU BOUGHT JUST DIDN'T LAST AND YOU DIDN'T HAVE MONEY TO GET MORE.: NEVER TRUE

## 2025-01-13 SDOH — ECONOMIC STABILITY: INCOME INSECURITY: IN THE LAST 12 MONTHS, WAS THERE A TIME WHEN YOU WERE NOT ABLE TO PAY THE MORTGAGE OR RENT ON TIME?: NO

## 2025-01-13 SDOH — ECONOMIC STABILITY: TRANSPORTATION INSECURITY
IN THE PAST 12 MONTHS, HAS THE LACK OF TRANSPORTATION KEPT YOU FROM MEDICAL APPOINTMENTS OR FROM GETTING MEDICATIONS?: NO

## 2025-01-13 SDOH — ECONOMIC STABILITY: FOOD INSECURITY: WITHIN THE PAST 12 MONTHS, YOU WORRIED THAT YOUR FOOD WOULD RUN OUT BEFORE YOU GOT MONEY TO BUY MORE.: NEVER TRUE

## 2025-01-14 ENCOUNTER — TELEMEDICINE (OUTPATIENT)
Dept: FAMILY MEDICINE CLINIC | Age: 40
End: 2025-01-14
Payer: COMMERCIAL

## 2025-01-14 DIAGNOSIS — K21.9 GASTROESOPHAGEAL REFLUX DISEASE WITHOUT ESOPHAGITIS: Primary | ICD-10-CM

## 2025-01-14 DIAGNOSIS — K58.9 IRRITABLE BOWEL SYNDROME, UNSPECIFIED TYPE: ICD-10-CM

## 2025-01-14 PROCEDURE — 99213 OFFICE O/P EST LOW 20 MIN: CPT | Performed by: FAMILY MEDICINE

## 2025-01-14 ASSESSMENT — ENCOUNTER SYMPTOMS
DIARRHEA: 1
WHEEZING: 0
VOMITING: 0
SHORTNESS OF BREATH: 0
CONSTIPATION: 0
COUGH: 0
NAUSEA: 1
ABDOMINAL PAIN: 0

## 2025-01-14 NOTE — PROGRESS NOTES
Heriberto Woods, was evaluated through a synchronous (real-time) audio-video encounter. The patient (or guardian if applicable) is aware that this is a billable service, which includes applicable co-pays. This Virtual Visit was conducted with patient's (and/or legal guardian's) consent. Patient identification was verified, and a caregiver was present when appropriate.   The patient was located at Home:  [facility] [id number]  Provider was located at Facility (Appt Dept): [address] [place] [phone]  Confirm you are appropriately licensed, registered, or certified to deliver care in the state where the patient is located as indicated above. If you are not or unsure, please re-schedule the visit: Yes, I confirm.     Heriberto Woods (:  1985) is a Established patient, presenting virtually for evaluation of the following:      Below is the assessment and plan developed based on review of pertinent history, physical exam, labs, studies, and medications.     Assessment & Plan  Gastroesophageal reflux disease without esophagitis    Discussed with patient that his symptoms seem to be flare up of her GERD, likely due to infection, recommend increasing Nexium to twice a day for the next week or two         Irritable bowel syndrome, unspecified type    Discussed with patient that his symptoms are likely flare up of his IBS, often due to viral infection, recommend avoiding high inflammation foods like tomato based, dairy based foods           Return if symptoms worsen or fail to improve.       Subjective   HPI  Patient presents with stool changes for last 2-3 weeks  Known exposures had a lot of diarrhea, fevers for 2-3 days, fatigue at [employer], gradually reintroduced foods into diet  Treatment normal Nexium  Better much better today  Worse when he has sense of urgency then he has to get to the bathroom immediately, happens about once a day (goes normally in the morning), has noticed

## 2025-01-14 NOTE — ASSESSMENT & PLAN NOTE
Discussed with patient that his symptoms seem to be flare up of her GERD, likely due to infection, recommend increasing Nexium to twice a day for the next week or two

## 2025-05-11 DIAGNOSIS — F41.9 ANXIETY: ICD-10-CM

## 2025-05-12 NOTE — TELEPHONE ENCOUNTER
Heriberto Woods needs refill of   Requested Prescriptions     Pending Prescriptions Disp Refills    sertraline (ZOLOFT) 50 MG tablet [Pharmacy Med Name: Sertraline HCl Oral Tablet 50 MG] 90 tablet 0     Sig: Take 1 tablet by mouth daily       Last Filled on:  11- #90 R-1 and sent to [facility] in [place], [place] by Halima Tsang DO.      Last Visit Date:  01/14/2025 as a VV    Next Visit Date:  Visit date not found

## 2025-08-18 DIAGNOSIS — F41.9 ANXIETY: ICD-10-CM
